# Patient Record
Sex: FEMALE | NOT HISPANIC OR LATINO | URBAN - METROPOLITAN AREA
[De-identification: names, ages, dates, MRNs, and addresses within clinical notes are randomized per-mention and may not be internally consistent; named-entity substitution may affect disease eponyms.]

---

## 2022-05-06 ENCOUNTER — APPOINTMENT (RX ONLY)
Dept: URBAN - METROPOLITAN AREA OTHER 13 | Facility: OTHER | Age: 72
Setting detail: DERMATOLOGY
End: 2022-05-06

## 2022-05-06 DIAGNOSIS — Z71.89 OTHER SPECIFIED COUNSELING: ICD-10-CM

## 2022-05-06 DIAGNOSIS — L57.8 OTHER SKIN CHANGES DUE TO CHRONIC EXPOSURE TO NONIONIZING RADIATION: ICD-10-CM

## 2022-05-06 DIAGNOSIS — L82.1 OTHER SEBORRHEIC KERATOSIS: ICD-10-CM

## 2022-05-06 DIAGNOSIS — L57.3 POIKILODERMA OF CIVATTE: ICD-10-CM

## 2022-05-06 DIAGNOSIS — L71.8 OTHER ROSACEA: ICD-10-CM | Status: INADEQUATELY CONTROLLED

## 2022-05-06 DIAGNOSIS — L65.9 NONSCARRING HAIR LOSS, UNSPECIFIED: ICD-10-CM | Status: INADEQUATELY CONTROLLED

## 2022-05-06 DIAGNOSIS — D22 MELANOCYTIC NEVI: ICD-10-CM

## 2022-05-06 DIAGNOSIS — I83.9 ASYMPTOMATIC VARICOSE VEINS OF LOWER EXTREMITIES: ICD-10-CM

## 2022-05-06 DIAGNOSIS — D18.0 HEMANGIOMA: ICD-10-CM

## 2022-05-06 DIAGNOSIS — L81.4 OTHER MELANIN HYPERPIGMENTATION: ICD-10-CM

## 2022-05-06 DIAGNOSIS — B35.1 TINEA UNGUIUM: ICD-10-CM | Status: INADEQUATELY CONTROLLED

## 2022-05-06 PROBLEM — D22.61 MELANOCYTIC NEVI OF RIGHT UPPER LIMB, INCLUDING SHOULDER: Status: ACTIVE | Noted: 2022-05-06

## 2022-05-06 PROBLEM — D22.5 MELANOCYTIC NEVI OF TRUNK: Status: ACTIVE | Noted: 2022-05-06

## 2022-05-06 PROBLEM — D18.01 HEMANGIOMA OF SKIN AND SUBCUTANEOUS TISSUE: Status: ACTIVE | Noted: 2022-05-06

## 2022-05-06 PROBLEM — D22.72 MELANOCYTIC NEVI OF LEFT LOWER LIMB, INCLUDING HIP: Status: ACTIVE | Noted: 2022-05-06

## 2022-05-06 PROBLEM — D22.62 MELANOCYTIC NEVI OF LEFT UPPER LIMB, INCLUDING SHOULDER: Status: ACTIVE | Noted: 2022-05-06

## 2022-05-06 PROBLEM — I83.93 ASYMPTOMATIC VARICOSE VEINS OF BILATERAL LOWER EXTREMITIES: Status: ACTIVE | Noted: 2022-05-06

## 2022-05-06 PROCEDURE — ? TREATMENT REGIMEN

## 2022-05-06 PROCEDURE — ? COUNSELING

## 2022-05-06 PROCEDURE — ? PRESCRIPTION

## 2022-05-06 PROCEDURE — 99204 OFFICE O/P NEW MOD 45 MIN: CPT

## 2022-05-06 PROCEDURE — ? PRESCRIPTION MEDICATION MANAGEMENT

## 2022-05-06 RX ORDER — TRETIONIN 0.5 MG/G
CREAM TOPICAL
Qty: 45 | Refills: 5 | COMMUNITY
Start: 2022-05-06

## 2022-05-06 RX ORDER — AZELAIC ACID 0.15 G/G
GEL TOPICAL
Qty: 50 | Refills: 3 | COMMUNITY
Start: 2022-05-06

## 2022-05-06 RX ADMIN — TRETIONIN: 0.5 CREAM TOPICAL at 00:00

## 2022-05-06 RX ADMIN — AZELAIC ACID: 0.15 GEL TOPICAL at 00:00

## 2022-05-06 ASSESSMENT — LOCATION ZONE DERM
LOCATION ZONE: SCALP
LOCATION ZONE: TRUNK
LOCATION ZONE: LEG
LOCATION ZONE: FACE
LOCATION ZONE: ARM
LOCATION ZONE: TOENAIL

## 2022-05-06 ASSESSMENT — LOCATION DETAILED DESCRIPTION DERM
LOCATION DETAILED: RIGHT DISTAL PRETIBIAL REGION
LOCATION DETAILED: LEFT DISTAL POSTERIOR UPPER ARM
LOCATION DETAILED: UPPER STERNUM
LOCATION DETAILED: RIGHT DISTAL POSTERIOR UPPER ARM
LOCATION DETAILED: LEFT GREAT TOENAIL
LOCATION DETAILED: LEFT PROXIMAL PRETIBIAL REGION
LOCATION DETAILED: EPIGASTRIC SKIN
LOCATION DETAILED: LEFT MEDIAL FRONTAL SCALP
LOCATION DETAILED: SUBXIPHOID
LOCATION DETAILED: SUPERIOR LUMBAR SPINE
LOCATION DETAILED: MIDDLE STERNUM
LOCATION DETAILED: RIGHT MEDIAL MALAR CHEEK
LOCATION DETAILED: RIGHT ANTERIOR DISTAL THIGH
LOCATION DETAILED: LEFT INFERIOR CENTRAL MALAR CHEEK
LOCATION DETAILED: RIGHT PROXIMAL POSTERIOR UPPER ARM
LOCATION DETAILED: SUPERIOR MID FOREHEAD
LOCATION DETAILED: LEFT ANTERIOR DISTAL THIGH
LOCATION DETAILED: LEFT DISTAL PRETIBIAL REGION
LOCATION DETAILED: INFERIOR THORACIC SPINE
LOCATION DETAILED: RIGHT PROXIMAL CALF
LOCATION DETAILED: RIGHT INFERIOR MEDIAL UPPER BACK
LOCATION DETAILED: LEFT PROXIMAL POSTERIOR UPPER ARM
LOCATION DETAILED: LEFT POPLITEAL SKIN
LOCATION DETAILED: RIGHT SUPERIOR MEDIAL UPPER BACK

## 2022-05-06 ASSESSMENT — LOCATION SIMPLE DESCRIPTION DERM
LOCATION SIMPLE: RIGHT UPPER BACK
LOCATION SIMPLE: CHEST
LOCATION SIMPLE: LEFT CHEEK
LOCATION SIMPLE: LOWER BACK
LOCATION SIMPLE: RIGHT CHEEK
LOCATION SIMPLE: LEFT PRETIBIAL REGION
LOCATION SIMPLE: SUPERIOR FOREHEAD
LOCATION SIMPLE: UPPER BACK
LOCATION SIMPLE: RIGHT PRETIBIAL REGION
LOCATION SIMPLE: RIGHT THIGH
LOCATION SIMPLE: LEFT POPLITEAL SKIN
LOCATION SIMPLE: LEFT SCALP
LOCATION SIMPLE: LEFT GREAT TOE
LOCATION SIMPLE: LEFT POSTERIOR UPPER ARM
LOCATION SIMPLE: RIGHT POSTERIOR UPPER ARM
LOCATION SIMPLE: ABDOMEN
LOCATION SIMPLE: LEFT THIGH
LOCATION SIMPLE: RIGHT CALF

## 2022-05-06 NOTE — PROCEDURE: TREATMENT REGIMEN
Detail Level: Zone
Continue Regimen: Finasteride 5mg tablet(patient was on this medication from her previous dermatologist) \\n\\nBiotin daily\\n\\nRogaine Topical Solution

## 2022-05-06 NOTE — PROCEDURE: PRESCRIPTION MEDICATION MANAGEMENT
Discontinue Regimen: Tretinoin (If she does want to use Tretinoin, do not use in the areas that rosacea is present)
Detail Level: Zone
Render In Strict Bullet Format?: No
Initiate Treatment: Finacea Gel daily to prevent redness
Samples Given: Lamisil cream-Apply to area daily, we discussed that the product will have to be used daily for a long period of time
Continue Regimen: Tretinoin 0.05% cream-Apply to forehead nightly( patient had this cream at previous dermatologist) (Stay away from rosacea areas of the face)

## 2022-08-24 ENCOUNTER — RX ONLY (OUTPATIENT)
Age: 72
Setting detail: RX ONLY
End: 2022-08-24

## 2022-08-24 RX ORDER — FINASTERIDE 5 MG/1
TABLET, FILM COATED ORAL
Qty: 30 | Refills: 2 | Status: ERX | COMMUNITY
Start: 2022-08-24 | End: 2022-10-01

## 2022-11-07 ENCOUNTER — OFFICE VISIT (OUTPATIENT)
Dept: URBAN - METROPOLITAN AREA CLINIC 54 | Facility: CLINIC | Age: 72
End: 2022-11-07
Payer: MEDICARE

## 2022-11-07 DIAGNOSIS — Z86.010 PERSONAL HISTORY OF COLONIC POLYPS: ICD-10-CM

## 2022-11-07 PROCEDURE — 99203 OFFICE O/P NEW LOW 30 MIN: CPT | Performed by: INTERNAL MEDICINE

## 2022-11-07 RX ORDER — MINOXIDIL 2 %
1 ML SOLUTION, NON-ORAL TOPICAL TWICE A DAY
Status: ACTIVE | COMMUNITY

## 2022-11-07 RX ORDER — CLONAZEPAM 0.5 MG/1
1 TABLET AT BEDTIME TABLET ORAL ONCE A DAY
Status: ACTIVE | COMMUNITY

## 2022-11-07 RX ORDER — SODIUM SULFATE, MAGNESIUM SULFATE, AND POTASSIUM CHLORIDE 17.75; 2.7; 2.25 G/1; G/1; G/1
12 TABLETS TABLET ORAL
Qty: 24 TABLETS | Refills: 0 | OUTPATIENT
Start: 2022-11-07 | End: 2022-11-08

## 2022-11-07 RX ORDER — DENOSUMAB 60 MG/ML
AS DIRECTED INJECTION SUBCUTANEOUS
Status: ACTIVE | COMMUNITY

## 2022-11-07 RX ORDER — LORATADINE 10 MG
1 TABLET TABLET ORAL ONCE A DAY
Status: ACTIVE | COMMUNITY

## 2022-11-07 NOTE — HPI-TODAY'S VISIT:
relocated from [facility].  Patient last c scope in 2017 +personal hx of colon polyps. 89 yo aunt w uterine ca. aunt with ovarian ca.  aunt w crc

## 2022-11-08 PROBLEM — 428283002: Status: ACTIVE | Noted: 2022-11-07

## 2022-12-06 ENCOUNTER — APPOINTMENT (RX ONLY)
Dept: URBAN - METROPOLITAN AREA OTHER 13 | Facility: OTHER | Age: 72
Setting detail: DERMATOLOGY
End: 2022-12-06

## 2022-12-06 DIAGNOSIS — L81.4 OTHER MELANIN HYPERPIGMENTATION: ICD-10-CM

## 2022-12-06 DIAGNOSIS — D485 NEOPLASM OF UNCERTAIN BEHAVIOR OF SKIN: ICD-10-CM

## 2022-12-06 PROBLEM — D48.5 NEOPLASM OF UNCERTAIN BEHAVIOR OF SKIN: Status: ACTIVE | Noted: 2022-12-06

## 2022-12-06 PROCEDURE — ? SHAVE REMOVAL

## 2022-12-06 PROCEDURE — ? COUNSELING

## 2022-12-06 PROCEDURE — 11300 SHAVE SKIN LESION 0.5 CM/<: CPT

## 2022-12-06 PROCEDURE — 99214 OFFICE O/P EST MOD 30 MIN: CPT | Mod: 25

## 2022-12-06 ASSESSMENT — LOCATION SIMPLE DESCRIPTION DERM
LOCATION SIMPLE: LEFT CHEEK
LOCATION SIMPLE: CHEST

## 2022-12-06 ASSESSMENT — LOCATION DETAILED DESCRIPTION DERM
LOCATION DETAILED: RIGHT LATERAL SUPERIOR CHEST
LOCATION DETAILED: LEFT INFERIOR CENTRAL MALAR CHEEK
LOCATION DETAILED: STERNAL NOTCH

## 2022-12-06 ASSESSMENT — LOCATION ZONE DERM
LOCATION ZONE: FACE
LOCATION ZONE: TRUNK

## 2022-12-06 NOTE — PROCEDURE: SHAVE REMOVAL
Medical Necessity Information: It is in your best interest to select a reason for this procedure from the list below. All of these items fulfill various CMS LCD requirements except the new and changing color options.
Medical Necessity Clause: This procedure was medically necessary because the lesion that was treated was:
Lab: [id number]
Lab Facility: 0
Detail Level: Detailed
Was A Bandage Applied: Yes
Size Of Lesion In Cm (Required): 0.4
Depth Of Shave: dermis
Biopsy Method: Dermablade
Anesthesia Type: 1% lidocaine with epinephrine
Hemostasis: Drysol
Wound Care: Petrolatum
Render Path Notes In Note?: No
Consent was obtained from the patient. The risks and benefits to therapy were discussed in detail. Specifically, the risks of infection, scarring, bleeding, prolonged wound healing, incomplete removal, allergy to anesthesia, nerve injury and recurrence were addressed. Prior to the procedure, the treatment site was clearly identified and confirmed by the patient. All components of Universal Protocol/PAUSE Rule completed.
Post-Care Instructions: I reviewed with the patient in detail post-care instructions. Patient is to keep the biopsy site dry overnight, and then apply bacitracin twice daily until healed. Patient may apply hydrogen peroxide soaks to remove any crusting.
Notification Instructions: Patient will be notified of pathology results. However, patient instructed to call the office if not contacted within 2 weeks.
Billing Type: Third-Party Bill

## 2022-12-14 ENCOUNTER — OFFICE VISIT (OUTPATIENT)
Dept: URBAN - METROPOLITAN AREA SURGERY CENTER 14 | Facility: SURGERY CENTER | Age: 72
End: 2022-12-14

## 2022-12-14 ENCOUNTER — CLAIMS CREATED FROM THE CLAIM WINDOW (OUTPATIENT)
Dept: URBAN - METROPOLITAN AREA CLINIC 4 | Facility: CLINIC | Age: 72
End: 2022-12-14
Payer: MEDICARE

## 2022-12-14 ENCOUNTER — CLAIMS CREATED FROM THE CLAIM WINDOW (OUTPATIENT)
Dept: URBAN - METROPOLITAN AREA SURGERY CENTER 14 | Facility: SURGERY CENTER | Age: 72
End: 2022-12-14
Payer: MEDICARE

## 2022-12-14 DIAGNOSIS — D12.0 BENIGN NEOPLASM OF CECUM: ICD-10-CM

## 2022-12-14 DIAGNOSIS — D12.0 ADENOMA OF CECUM: ICD-10-CM

## 2022-12-14 DIAGNOSIS — Z86.010 ADENOMAS PERSONAL HISTORY OF COLONIC POLYPS: ICD-10-CM

## 2022-12-14 DIAGNOSIS — K55.20 ACQUIRED ARTERIOVENOUS MALFORMATION OF COLON: ICD-10-CM

## 2022-12-14 PROCEDURE — G8907 PT DOC NO EVENTS ON DISCHARG: HCPCS | Performed by: INTERNAL MEDICINE

## 2022-12-14 PROCEDURE — 45385 COLONOSCOPY W/LESION REMOVAL: CPT | Performed by: INTERNAL MEDICINE

## 2022-12-14 PROCEDURE — 88305 TISSUE EXAM BY PATHOLOGIST: CPT | Performed by: PATHOLOGY

## 2022-12-20 ENCOUNTER — APPOINTMENT (RX ONLY)
Dept: URBAN - METROPOLITAN AREA OTHER 13 | Facility: OTHER | Age: 72
Setting detail: DERMATOLOGY
End: 2022-12-20

## 2022-12-20 DIAGNOSIS — L82.0 INFLAMED SEBORRHEIC KERATOSIS: ICD-10-CM

## 2022-12-20 DIAGNOSIS — L57.0 ACTINIC KERATOSIS: ICD-10-CM

## 2022-12-20 DIAGNOSIS — L259 CONTACT DERMATITIS AND OTHER ECZEMA, UNSPECIFIED CAUSE: ICD-10-CM

## 2022-12-20 PROBLEM — L30.8 OTHER SPECIFIED DERMATITIS: Status: ACTIVE | Noted: 2022-12-20

## 2022-12-20 PROCEDURE — ? PRESCRIPTION MEDICATION MANAGEMENT

## 2022-12-20 PROCEDURE — 99214 OFFICE O/P EST MOD 30 MIN: CPT | Mod: 25

## 2022-12-20 PROCEDURE — ? LIQUID NITROGEN

## 2022-12-20 PROCEDURE — 17110 DESTRUCTION B9 LES UP TO 14: CPT

## 2022-12-20 PROCEDURE — ? COUNSELING

## 2022-12-20 PROCEDURE — 17000 DESTRUCT PREMALG LESION: CPT | Mod: 59

## 2022-12-20 ASSESSMENT — LOCATION DETAILED DESCRIPTION DERM
LOCATION DETAILED: UPPER STERNUM
LOCATION DETAILED: SUPERIOR THORACIC SPINE
LOCATION DETAILED: RIGHT LATERAL SUPERIOR CHEST

## 2022-12-20 ASSESSMENT — LOCATION SIMPLE DESCRIPTION DERM
LOCATION SIMPLE: UPPER BACK
LOCATION SIMPLE: CHEST

## 2022-12-20 ASSESSMENT — LOCATION ZONE DERM: LOCATION ZONE: TRUNK

## 2022-12-20 NOTE — PROCEDURE: LIQUID NITROGEN
Render Post-Care Instructions In Note?: no
Show Applicator Variable?: Yes
Post-Care Instructions: I reviewed with the patient in detail post-care instructions. Patient is to wear sunprotection, and avoid picking at any of the treated lesions. Pt may apply Vaseline to crusted or scabbing areas.
Detail Level: Detailed
Number Of Freeze-Thaw Cycles: 3 freeze-thaw cycles
Consent: The patient's consent was obtained including but not limited to risks of crusting, scabbing, blistering, scarring, darker or lighter pigmentary change, recurrence, incomplete removal and infection.
Duration Of Freeze Thaw-Cycle (Seconds): 0
Spray Paint Text: The liquid nitrogen was applied to the skin utilizing a spray paint frosting technique.
Medical Necessity Information: It is in your best interest to select a reason for this procedure from the list below. All of these items fulfill various CMS LCD requirements except the new and changing color options.
Medical Necessity Clause: This procedure was medically necessary because the lesions that were treated were:

## 2022-12-20 NOTE — PROCEDURE: PRESCRIPTION MEDICATION MANAGEMENT
Detail Level: Zone
Render In Strict Bullet Format?: No
Plan: If spot does not go away we will biopsy it
Initiate Treatment: Use hydrocortisone (patient already has)

## 2023-01-12 ENCOUNTER — TELEPHONE ENCOUNTER (OUTPATIENT)
Dept: URBAN - METROPOLITAN AREA CLINIC 54 | Facility: CLINIC | Age: 73
End: 2023-01-12

## 2023-03-16 ENCOUNTER — APPOINTMENT (RX ONLY)
Dept: URBAN - METROPOLITAN AREA OTHER 13 | Facility: OTHER | Age: 73
Setting detail: DERMATOLOGY
End: 2023-03-16

## 2023-03-16 DIAGNOSIS — L81.4 OTHER MELANIN HYPERPIGMENTATION: ICD-10-CM

## 2023-03-16 DIAGNOSIS — L82.0 INFLAMED SEBORRHEIC KERATOSIS: ICD-10-CM

## 2023-03-16 PROCEDURE — 17110 DESTRUCTION B9 LES UP TO 14: CPT

## 2023-03-16 PROCEDURE — ? LIQUID NITROGEN

## 2023-03-16 PROCEDURE — 99213 OFFICE O/P EST LOW 20 MIN: CPT | Mod: 25

## 2023-03-16 PROCEDURE — ? COUNSELING

## 2023-03-16 ASSESSMENT — LOCATION DETAILED DESCRIPTION DERM
LOCATION DETAILED: RIGHT INFERIOR LATERAL NECK
LOCATION DETAILED: RIGHT DORSAL THUMB METACARPOPHALANGEAL JOINT
LOCATION DETAILED: RIGHT CENTRAL LATERAL NECK
LOCATION DETAILED: RIGHT CENTRAL ANTERIOR NECK

## 2023-03-16 ASSESSMENT — LOCATION SIMPLE DESCRIPTION DERM
LOCATION SIMPLE: NECK
LOCATION SIMPLE: RIGHT THUMB

## 2023-03-16 ASSESSMENT — LOCATION ZONE DERM
LOCATION ZONE: FINGER
LOCATION ZONE: NECK

## 2023-03-16 NOTE — PROCEDURE: LIQUID NITROGEN
Post-Care Instructions: I reviewed with the patient in detail post-care instructions. Patient is to wear sunprotection, and avoid picking at any of the treated lesions. Pt may apply Vaseline to crusted or scabbing areas.
Show Spray Paint Technique Variable?: Yes
Detail Level: Detailed
Add 52 Modifier (Optional): no
Spray Paint Text: The liquid nitrogen was applied to the skin utilizing a spray paint frosting technique.
Consent: The patient's consent was obtained including but not limited to risks of crusting, scabbing, blistering, scarring, darker or lighter pigmentary change, recurrence, incomplete removal and infection.
Medical Necessity Information: It is in your best interest to select a reason for this procedure from the list below. All of these items fulfill various CMS LCD requirements except the new and changing color options.
Medical Necessity Clause: This procedure was medically necessary because the lesions that were treated were:

## 2023-03-23 ENCOUNTER — WEB ENCOUNTER (OUTPATIENT)
Dept: URBAN - METROPOLITAN AREA CLINIC 54 | Facility: CLINIC | Age: 73
End: 2023-03-23

## 2023-03-28 ENCOUNTER — WEB ENCOUNTER (OUTPATIENT)
Dept: URBAN - METROPOLITAN AREA CLINIC 54 | Facility: CLINIC | Age: 73
End: 2023-03-28

## 2023-04-02 ENCOUNTER — LAB OUTSIDE AN ENCOUNTER (OUTPATIENT)
Dept: URBAN - METROPOLITAN AREA CLINIC 54 | Facility: CLINIC | Age: 73
End: 2023-04-02

## 2023-04-08 ENCOUNTER — WEB ENCOUNTER (OUTPATIENT)
Dept: URBAN - METROPOLITAN AREA CLINIC 54 | Facility: CLINIC | Age: 73
End: 2023-04-08

## 2023-05-10 ENCOUNTER — OFFICE VISIT (OUTPATIENT)
Dept: URBAN - METROPOLITAN AREA SURGERY CENTER 14 | Facility: SURGERY CENTER | Age: 73
End: 2023-05-10

## 2023-05-10 ENCOUNTER — WEB ENCOUNTER (OUTPATIENT)
Dept: URBAN - METROPOLITAN AREA CLINIC 54 | Facility: CLINIC | Age: 73
End: 2023-05-10

## 2023-05-11 ENCOUNTER — WEB ENCOUNTER (OUTPATIENT)
Dept: URBAN - METROPOLITAN AREA CLINIC 54 | Facility: CLINIC | Age: 73
End: 2023-05-11

## 2023-05-25 ENCOUNTER — APPOINTMENT (RX ONLY)
Dept: URBAN - METROPOLITAN AREA OTHER 13 | Facility: OTHER | Age: 73
Setting detail: DERMATOLOGY
End: 2023-05-25

## 2023-05-25 DIAGNOSIS — L57.3 POIKILODERMA OF CIVATTE: ICD-10-CM

## 2023-05-25 DIAGNOSIS — I83.9 ASYMPTOMATIC VARICOSE VEINS OF LOWER EXTREMITIES: ICD-10-CM

## 2023-05-25 DIAGNOSIS — L72.0 EPIDERMAL CYST: ICD-10-CM

## 2023-05-25 DIAGNOSIS — L82.0 INFLAMED SEBORRHEIC KERATOSIS: ICD-10-CM

## 2023-05-25 DIAGNOSIS — L72.2 STEATOCYSTOMA MULTIPLEX: ICD-10-CM

## 2023-05-25 DIAGNOSIS — D18.0 HEMANGIOMA: ICD-10-CM

## 2023-05-25 DIAGNOSIS — L73.8 OTHER SPECIFIED FOLLICULAR DISORDERS: ICD-10-CM

## 2023-05-25 DIAGNOSIS — L82.1 OTHER SEBORRHEIC KERATOSIS: ICD-10-CM

## 2023-05-25 DIAGNOSIS — Z71.89 OTHER SPECIFIED COUNSELING: ICD-10-CM

## 2023-05-25 DIAGNOSIS — L81.4 OTHER MELANIN HYPERPIGMENTATION: ICD-10-CM

## 2023-05-25 DIAGNOSIS — D22 MELANOCYTIC NEVI: ICD-10-CM

## 2023-05-25 PROBLEM — I83.93 ASYMPTOMATIC VARICOSE VEINS OF BILATERAL LOWER EXTREMITIES: Status: ACTIVE | Noted: 2023-05-25

## 2023-05-25 PROBLEM — D18.01 HEMANGIOMA OF SKIN AND SUBCUTANEOUS TISSUE: Status: ACTIVE | Noted: 2023-05-25

## 2023-05-25 PROBLEM — D22.61 MELANOCYTIC NEVI OF RIGHT UPPER LIMB, INCLUDING SHOULDER: Status: ACTIVE | Noted: 2023-05-25

## 2023-05-25 PROBLEM — D22.5 MELANOCYTIC NEVI OF TRUNK: Status: ACTIVE | Noted: 2023-05-25

## 2023-05-25 PROBLEM — D22.62 MELANOCYTIC NEVI OF LEFT UPPER LIMB, INCLUDING SHOULDER: Status: ACTIVE | Noted: 2023-05-25

## 2023-05-25 PROBLEM — D22.72 MELANOCYTIC NEVI OF LEFT LOWER LIMB, INCLUDING HIP: Status: ACTIVE | Noted: 2023-05-25

## 2023-05-25 PROCEDURE — 99214 OFFICE O/P EST MOD 30 MIN: CPT | Mod: 25

## 2023-05-25 PROCEDURE — ? LIQUID NITROGEN

## 2023-05-25 PROCEDURE — 17110 DESTRUCTION B9 LES UP TO 14: CPT

## 2023-05-25 PROCEDURE — ? COUNSELING

## 2023-05-25 ASSESSMENT — LOCATION ZONE DERM
LOCATION ZONE: TRUNK
LOCATION ZONE: FACE
LOCATION ZONE: LEG
LOCATION ZONE: VULVA
LOCATION ZONE: NECK
LOCATION ZONE: NOSE
LOCATION ZONE: ARM
LOCATION ZONE: FINGER

## 2023-05-25 ASSESSMENT — LOCATION SIMPLE DESCRIPTION DERM
LOCATION SIMPLE: RIGHT THIGH
LOCATION SIMPLE: LEFT NOSE
LOCATION SIMPLE: RIGHT UPPER BACK
LOCATION SIMPLE: RIGHT BREAST
LOCATION SIMPLE: RIGHT UPPER ARM
LOCATION SIMPLE: RIGHT PRETIBIAL REGION
LOCATION SIMPLE: CHEST
LOCATION SIMPLE: LEFT UPPER ARM
LOCATION SIMPLE: INFERIOR FOREHEAD
LOCATION SIMPLE: LEFT POPLITEAL SKIN
LOCATION SIMPLE: LEFT PRETIBIAL REGION
LOCATION SIMPLE: RIGHT THUMB
LOCATION SIMPLE: RIGHT CALF
LOCATION SIMPLE: RIGHT POSTERIOR UPPER ARM
LOCATION SIMPLE: LEFT ANTERIOR NECK
LOCATION SIMPLE: RIGHT TEMPLE
LOCATION SIMPLE: ABDOMEN
LOCATION SIMPLE: LABIA MAJORA
LOCATION SIMPLE: LEFT POSTERIOR UPPER ARM
LOCATION SIMPLE: LEFT THIGH
LOCATION SIMPLE: LOWER BACK

## 2023-05-25 ASSESSMENT — LOCATION DETAILED DESCRIPTION DERM
LOCATION DETAILED: LEFT PROXIMAL PRETIBIAL REGION
LOCATION DETAILED: SUPERIOR LUMBAR SPINE
LOCATION DETAILED: RIGHT ANTERIOR DISTAL THIGH
LOCATION DETAILED: LEFT INFERIOR ANTERIOR NECK
LOCATION DETAILED: INFERIOR MID FOREHEAD
LOCATION DETAILED: SUBXIPHOID
LOCATION DETAILED: RIGHT DISTAL POSTERIOR UPPER ARM
LOCATION DETAILED: LEFT ANTERIOR DISTAL THIGH
LOCATION DETAILED: RIGHT DISTAL PRETIBIAL REGION
LOCATION DETAILED: MIDDLE STERNUM
LOCATION DETAILED: LEFT PROXIMAL POSTERIOR UPPER ARM
LOCATION DETAILED: LEFT NASAL ALA
LOCATION DETAILED: RIGHT DORSAL THUMB METACARPOPHALANGEAL JOINT
LOCATION DETAILED: LEFT CLAVICULAR NECK
LOCATION DETAILED: LEFT DISTAL POSTERIOR UPPER ARM
LOCATION DETAILED: RIGHT INFERIOR MEDIAL UPPER BACK
LOCATION DETAILED: LEFT INFERIOR LATERAL NECK
LOCATION DETAILED: RIGHT LABIUM MAJUS
LOCATION DETAILED: RIGHT CENTRAL TEMPLE
LOCATION DETAILED: RIGHT MEDIAL BREAST 3-4:00 REGION
LOCATION DETAILED: LEFT POPLITEAL SKIN
LOCATION DETAILED: RIGHT ANTERIOR PROXIMAL THIGH
LOCATION DETAILED: RIGHT PROXIMAL POSTERIOR UPPER ARM
LOCATION DETAILED: EPIGASTRIC SKIN
LOCATION DETAILED: LEFT DISTAL PRETIBIAL REGION
LOCATION DETAILED: RIGHT PROXIMAL CALF
LOCATION DETAILED: RIGHT PROXIMAL PRETIBIAL REGION
LOCATION DETAILED: RIGHT SUPERIOR MEDIAL UPPER BACK
LOCATION DETAILED: LEFT ANTERIOR DISTAL UPPER ARM
LOCATION DETAILED: RIGHT ANTERIOR LATERAL DISTAL UPPER ARM

## 2023-05-25 NOTE — PROCEDURE: MIPS QUALITY
Quality 47: Advance Care Plan: Advance Care Planning discussed and documented; advance care plan or surrogate decision maker documented in the medical record.
Quality 110: Preventive Care And Screening: Influenza Immunization: Influenza Immunization Administered during Influenza season
Quality 431: Preventive Care And Screening: Unhealthy Alcohol Use - Screening: Patient not identified as an unhealthy alcohol user when screened for unhealthy alcohol use using a systematic screening method
Quality 226: Preventive Care And Screening: Tobacco Use: Screening And Cessation Intervention: Patient screened for tobacco use and is an ex/non-smoker
Quality 111:Pneumonia Vaccination Status For Older Adults: Patient received any pneumococcal conjugate or polysaccharide vaccine on or after their 60th birthday and before the end of the measurement period
Quality 130: Documentation Of Current Medications In The Medical Record: Current Medications Documented
Detail Level: Detailed

## 2023-06-07 ENCOUNTER — OFFICE VISIT (OUTPATIENT)
Dept: URBAN - METROPOLITAN AREA SURGERY CENTER 14 | Facility: SURGERY CENTER | Age: 73
End: 2023-06-07

## 2023-06-16 ENCOUNTER — OFFICE VISIT (OUTPATIENT)
Dept: URBAN - METROPOLITAN AREA SURGERY CENTER 14 | Facility: SURGERY CENTER | Age: 73
End: 2023-06-16

## 2023-06-21 ENCOUNTER — CLAIMS CREATED FROM THE CLAIM WINDOW (OUTPATIENT)
Dept: URBAN - METROPOLITAN AREA CLINIC 4 | Facility: CLINIC | Age: 73
End: 2023-06-21
Payer: MEDICARE

## 2023-06-21 ENCOUNTER — CLAIMS CREATED FROM THE CLAIM WINDOW (OUTPATIENT)
Dept: URBAN - METROPOLITAN AREA SURGERY CENTER 14 | Facility: SURGERY CENTER | Age: 73
End: 2023-06-21

## 2023-06-21 ENCOUNTER — CLAIMS CREATED FROM THE CLAIM WINDOW (OUTPATIENT)
Dept: URBAN - METROPOLITAN AREA SURGERY CENTER 14 | Facility: SURGERY CENTER | Age: 73
End: 2023-06-21
Payer: MEDICARE

## 2023-06-21 DIAGNOSIS — K31.89 ACQUIRED DEFORMITY OF DUODENUM: ICD-10-CM

## 2023-06-21 DIAGNOSIS — D13.2 ADENOMA OF DUODENUM: ICD-10-CM

## 2023-06-21 DIAGNOSIS — K31.7 POLYP OF STOMACH AND DUODENUM: ICD-10-CM

## 2023-06-21 DIAGNOSIS — K31.89 OTHER DISEASES OF STOMACH AND DUODENUM: ICD-10-CM

## 2023-06-21 DIAGNOSIS — K21.00 ALKALINE REFLUX ESOPHAGITIS: ICD-10-CM

## 2023-06-21 PROCEDURE — 88312 SPECIAL STAINS GROUP 1: CPT | Performed by: PATHOLOGY

## 2023-06-21 PROCEDURE — 88305 TISSUE EXAM BY PATHOLOGIST: CPT | Performed by: PATHOLOGY

## 2023-06-21 PROCEDURE — 43239 EGD BIOPSY SINGLE/MULTIPLE: CPT | Performed by: INTERNAL MEDICINE

## 2023-06-21 PROCEDURE — G8907 PT DOC NO EVENTS ON DISCHARG: HCPCS | Performed by: INTERNAL MEDICINE

## 2023-06-29 ENCOUNTER — TELEPHONE ENCOUNTER (OUTPATIENT)
Dept: URBAN - METROPOLITAN AREA CLINIC 23 | Facility: CLINIC | Age: 73
End: 2023-06-29

## 2023-06-29 ENCOUNTER — WEB ENCOUNTER (OUTPATIENT)
Dept: URBAN - METROPOLITAN AREA CLINIC 54 | Facility: CLINIC | Age: 73
End: 2023-06-29

## 2023-07-06 ENCOUNTER — RX ONLY (OUTPATIENT)
Age: 73
Setting detail: RX ONLY
End: 2023-07-06

## 2023-07-06 RX ORDER — TRETINOIN 0.5 MG/G
CREAM TOPICAL
Qty: 45 | Refills: 0 | Status: ERX | COMMUNITY
Start: 2023-07-06

## 2023-07-06 RX ORDER — AZELAIC ACID 0.15 G/G
GEL TOPICAL
Qty: 50 | Refills: 3 | Status: ERX

## 2023-07-06 NOTE — PROCEDURE: MIPS QUALITY
Detail Level: Detailed
Quality 226: Preventive Care And Screening: Tobacco Use: Screening And Cessation Intervention: Patient screened for tobacco use and is an ex/non-smoker
Quality 111:Pneumonia Vaccination Status For Older Adults: Pneumococcal Vaccination Previously Received
Quality 402: Tobacco Use And Help With Quitting Among Adolescents: Patient screened for tobacco and never smoked
Quality 431: Preventive Care And Screening: Unhealthy Alcohol Use - Screening: Patient not identified as an unhealthy alcohol user when screened for unhealthy alcohol use using a systematic screening method
Quality 110: Preventive Care And Screening: Influenza Immunization: Influenza Immunization Administered during Influenza season
Statement Selected

## 2023-07-14 ENCOUNTER — TELEPHONE ENCOUNTER (OUTPATIENT)
Dept: URBAN - METROPOLITAN AREA CLINIC 54 | Facility: CLINIC | Age: 73
End: 2023-07-14

## 2023-07-19 ENCOUNTER — LAB OUTSIDE AN ENCOUNTER (OUTPATIENT)
Dept: URBAN - METROPOLITAN AREA CLINIC 54 | Facility: CLINIC | Age: 73
End: 2023-07-19

## 2023-09-02 ENCOUNTER — WEB ENCOUNTER (OUTPATIENT)
Dept: URBAN - METROPOLITAN AREA CLINIC 54 | Facility: CLINIC | Age: 73
End: 2023-09-02

## 2023-09-27 ENCOUNTER — OFFICE VISIT (OUTPATIENT)
Dept: URBAN - METROPOLITAN AREA MEDICAL CENTER 28 | Facility: MEDICAL CENTER | Age: 73
End: 2023-09-27
Payer: MEDICARE

## 2023-09-27 DIAGNOSIS — K86.89 ACUTE PANCREATIC FLUID COLLECTION: ICD-10-CM

## 2023-09-27 DIAGNOSIS — R93.3 ABN FINDINGS-GI TRACT: ICD-10-CM

## 2023-09-27 PROCEDURE — 43259 EGD US EXAM DUODENUM/JEJUNUM: CPT | Performed by: INTERNAL MEDICINE

## 2023-09-27 RX ORDER — LORATADINE 10 MG
1 TABLET TABLET ORAL ONCE A DAY
Status: ACTIVE | COMMUNITY

## 2023-09-27 RX ORDER — DENOSUMAB 60 MG/ML
AS DIRECTED INJECTION SUBCUTANEOUS
Status: ACTIVE | COMMUNITY

## 2023-09-27 RX ORDER — CLONAZEPAM 0.5 MG/1
1 TABLET AT BEDTIME TABLET ORAL ONCE A DAY
Status: ACTIVE | COMMUNITY

## 2023-09-27 RX ORDER — MINOXIDIL 2 %
1 ML SOLUTION, NON-ORAL TOPICAL TWICE A DAY
Status: ACTIVE | COMMUNITY

## 2023-10-25 ENCOUNTER — OFFICE VISIT (OUTPATIENT)
Dept: URBAN - NONMETROPOLITAN AREA CLINIC 4 | Facility: CLINIC | Age: 73
End: 2023-10-25

## 2023-10-25 ENCOUNTER — OFFICE VISIT (OUTPATIENT)
Dept: URBAN - METROPOLITAN AREA CLINIC 54 | Facility: CLINIC | Age: 73
End: 2023-10-25

## 2023-10-27 ENCOUNTER — DASHBOARD ENCOUNTERS (OUTPATIENT)
Age: 73
End: 2023-10-27

## 2023-10-27 ENCOUNTER — CLAIMS CREATED FROM THE CLAIM WINDOW (OUTPATIENT)
Dept: URBAN - METROPOLITAN AREA CLINIC 54 | Facility: CLINIC | Age: 73
End: 2023-10-27
Payer: MEDICARE

## 2023-10-27 VITALS
HEIGHT: 65 IN | TEMPERATURE: 97.4 F | DIASTOLIC BLOOD PRESSURE: 79 MMHG | WEIGHT: 137.8 LBS | SYSTOLIC BLOOD PRESSURE: 134 MMHG | BODY MASS INDEX: 22.96 KG/M2 | HEART RATE: 70 BPM

## 2023-10-27 DIAGNOSIS — Z83.79 FAMILY HISTORY OF BARRETT'S ESOPHAGUS: ICD-10-CM

## 2023-10-27 DIAGNOSIS — D49.0 IPMN (INTRADUCTAL PAPILLARY MUCINOUS NEOPLASM): ICD-10-CM

## 2023-10-27 DIAGNOSIS — D13.2 DUODENAL ADENOMA: ICD-10-CM

## 2023-10-27 DIAGNOSIS — K21.00 GASTROESOPHAGEAL REFLUX DISEASE WITH ESOPHAGITIS WITHOUT HEMORRHAGE: ICD-10-CM

## 2023-10-27 DIAGNOSIS — Z86.010 HISTORY OF COLON POLYPS: ICD-10-CM

## 2023-10-27 PROBLEM — 266433003: Status: ACTIVE | Noted: 2023-10-27

## 2023-10-27 PROCEDURE — 99214 OFFICE O/P EST MOD 30 MIN: CPT

## 2023-10-27 RX ORDER — CLONAZEPAM 0.5 MG/1
1 TABLET AT BEDTIME TABLET ORAL ONCE A DAY
Status: ACTIVE | COMMUNITY

## 2023-10-27 RX ORDER — MINOXIDIL 2 %
1 ML SOLUTION, NON-ORAL TOPICAL TWICE A DAY
Status: ACTIVE | COMMUNITY

## 2023-10-27 RX ORDER — DENOSUMAB 60 MG/ML
AS DIRECTED INJECTION SUBCUTANEOUS
Status: ACTIVE | COMMUNITY

## 2023-10-27 RX ORDER — LORATADINE 10 MG
1 TABLET TABLET ORAL ONCE A DAY
Status: ACTIVE | COMMUNITY

## 2023-10-27 NOTE — HPI-TODAY'S VISIT:
11/27/22:1relocated from [facility].  Patient last c scope in 2017 +personal hx of colon polyps. 91 yo aunt w uterine ca. aunt with ovarian ca.  aunt w crc  10/27/23: Pt presents for EUS f/u. No complaints  Colonoscopy 12/14/22: - Perianal skin tags found on perianal exam. - Diverticulosis in the entire examined colon. - One 3 mm polyp in the cecum, removed with a cold snare. Resected and retrieved. - The examination was otherwise normal. - A single non-bleeding colonic angiodysplastic lesion. Biopsy: TA, 5 years  EGD 6/21/23: - LA Grade B reflux esophagitis with no bleeding. Biopsied. - Erythematous mucosa in the stomach. Biopsied. - Mucosal changes in the duodenum. Biopsied. - The examination was otherwise normal. Biopsy:  - Duodenum: Tubular adenoma with low grade dysplasia.  - Stomach: Foveolar hyperplasia. No evidence of H pylori organisms or intestinal metaplasia. Negative for dysplasia or malignancy.  - GE Junction: Squamocolumnar mucosa with reflux type changes. No evidence of Anderson's esophagus or EoE.  EUS 9/27/23: - Normal esophagus. - Normal stomach. - Normal examined duodenum. - A cystic lesion was seen in the pancreatic body. Tissue has not been obtained. However, the endosonographic appearance is suspicious for a branched intraductal papillary mucinous neoplasm. - Pancreatic parenchymal abnormalities consisting of hyperechoic strands, hyperechoic foci and lobularity were noted in the entire pancreas. - There was no sign of significant pathology in the common bile duct. - There was no sign of significant pathology in the examined duodenum. - No specimens collected.

## 2023-12-16 ENCOUNTER — RX ONLY (OUTPATIENT)
Age: 73
Setting detail: RX ONLY
End: 2023-12-16

## 2023-12-16 RX ORDER — TRETINOIN 0.5 MG/G
CREAM TOPICAL
Qty: 45 | Refills: 2 | Status: ERX

## 2024-05-31 ENCOUNTER — APPOINTMENT (RX ONLY)
Dept: URBAN - METROPOLITAN AREA OTHER 13 | Facility: OTHER | Age: 74
Setting detail: DERMATOLOGY
End: 2024-05-31

## 2024-05-31 DIAGNOSIS — Z71.89 OTHER SPECIFIED COUNSELING: ICD-10-CM

## 2024-05-31 DIAGNOSIS — L81.4 OTHER MELANIN HYPERPIGMENTATION: ICD-10-CM

## 2024-05-31 DIAGNOSIS — L73.8 OTHER SPECIFIED FOLLICULAR DISORDERS: ICD-10-CM

## 2024-05-31 DIAGNOSIS — L82.0 INFLAMED SEBORRHEIC KERATOSIS: ICD-10-CM

## 2024-05-31 DIAGNOSIS — L82.1 OTHER SEBORRHEIC KERATOSIS: ICD-10-CM

## 2024-05-31 DIAGNOSIS — D18.0 HEMANGIOMA: ICD-10-CM

## 2024-05-31 DIAGNOSIS — D485 NEOPLASM OF UNCERTAIN BEHAVIOR OF SKIN: ICD-10-CM

## 2024-05-31 DIAGNOSIS — L57.3 POIKILODERMA OF CIVATTE: ICD-10-CM

## 2024-05-31 PROBLEM — D18.01 HEMANGIOMA OF SKIN AND SUBCUTANEOUS TISSUE: Status: ACTIVE | Noted: 2024-05-31

## 2024-05-31 PROBLEM — D48.5 NEOPLASM OF UNCERTAIN BEHAVIOR OF SKIN: Status: ACTIVE | Noted: 2024-05-31

## 2024-05-31 PROCEDURE — ? OBSERVATION

## 2024-05-31 PROCEDURE — ? LIQUID NITROGEN

## 2024-05-31 PROCEDURE — 99213 OFFICE O/P EST LOW 20 MIN: CPT | Mod: 25

## 2024-05-31 PROCEDURE — ? BIOPSY BY SHAVE METHOD

## 2024-05-31 PROCEDURE — 11102 TANGNTL BX SKIN SINGLE LES: CPT | Mod: 59

## 2024-05-31 PROCEDURE — 17110 DESTRUCTION B9 LES UP TO 14: CPT

## 2024-05-31 PROCEDURE — ? COUNSELING

## 2024-05-31 ASSESSMENT — LOCATION SIMPLE DESCRIPTION DERM
LOCATION SIMPLE: LEFT CALF
LOCATION SIMPLE: RIGHT FOREHEAD
LOCATION SIMPLE: NECK
LOCATION SIMPLE: RIGHT THIGH
LOCATION SIMPLE: LEFT CHEEK
LOCATION SIMPLE: CHEST
LOCATION SIMPLE: LEFT POSTERIOR UPPER ARM
LOCATION SIMPLE: ABDOMEN
LOCATION SIMPLE: RIGHT POSTERIOR UPPER ARM
LOCATION SIMPLE: RIGHT CHEEK
LOCATION SIMPLE: RIGHT UPPER BACK

## 2024-05-31 ASSESSMENT — LOCATION DETAILED DESCRIPTION DERM
LOCATION DETAILED: RIGHT INFERIOR FOREHEAD
LOCATION DETAILED: RIGHT MID-UPPER BACK
LOCATION DETAILED: LEFT PROXIMAL CALF
LOCATION DETAILED: RIGHT CENTRAL MALAR CHEEK
LOCATION DETAILED: RIGHT INFERIOR LATERAL NECK
LOCATION DETAILED: UPPER STERNUM
LOCATION DETAILED: EPIGASTRIC SKIN
LOCATION DETAILED: RIGHT MEDIAL MALAR CHEEK
LOCATION DETAILED: RIGHT ANTERIOR DISTAL THIGH
LOCATION DETAILED: LEFT INFERIOR CENTRAL MALAR CHEEK
LOCATION DETAILED: RIGHT SUPERIOR UPPER BACK
LOCATION DETAILED: LEFT DISTAL POSTERIOR UPPER ARM
LOCATION DETAILED: RIGHT PROXIMAL POSTERIOR UPPER ARM
LOCATION DETAILED: RIGHT CENTRAL LATERAL NECK
LOCATION DETAILED: RIGHT ANTERIOR PROXIMAL THIGH
LOCATION DETAILED: MIDDLE STERNUM

## 2024-05-31 ASSESSMENT — LOCATION ZONE DERM
LOCATION ZONE: LEG
LOCATION ZONE: TRUNK
LOCATION ZONE: FACE
LOCATION ZONE: NECK
LOCATION ZONE: ARM

## 2024-05-31 NOTE — PROCEDURE: OBSERVATION
Detail Level: Detailed
Size Of Lesion In Cm (Optional): 0
Morphology Per Location (Optional): Baseline photo 5/31/2024

## 2024-05-31 NOTE — PROCEDURE: LIQUID NITROGEN
Include Z78.9 (Other Specified Conditions Influencing Health Status) As An Associated Diagnosis?: No
Medical Necessity Information: It is in your best interest to select a reason for this procedure from the list below. All of these items fulfill various CMS LCD requirements except the new and changing color options.
Show Topical Anesthesia Variable?: Yes
Medical Necessity Clause: This procedure was medically necessary because the lesions that were treated were:
Spray Paint Text: The liquid nitrogen was applied to the skin utilizing a spray paint frosting technique.
Post-Care Instructions: I reviewed with the patient in detail post-care instructions. Patient is to wear sunprotection, and avoid picking at any of the treated lesions. Pt may apply Vaseline to crusted or scabbing areas.
Consent: The patient's consent was obtained including but not limited to risks of crusting, scabbing, blistering, scarring, darker or lighter pigmentary change, recurrence, incomplete removal and infection.
Detail Level: Detailed

## 2024-10-21 ENCOUNTER — OFFICE VISIT (OUTPATIENT)
Dept: URBAN - NONMETROPOLITAN AREA CLINIC 4 | Facility: CLINIC | Age: 74
End: 2024-10-21

## 2024-10-21 ENCOUNTER — LAB OUTSIDE AN ENCOUNTER (OUTPATIENT)
Dept: URBAN - METROPOLITAN AREA CLINIC 54 | Facility: CLINIC | Age: 74
End: 2024-10-21

## 2024-10-21 ENCOUNTER — OFFICE VISIT (OUTPATIENT)
Dept: URBAN - METROPOLITAN AREA CLINIC 54 | Facility: CLINIC | Age: 74
End: 2024-10-21
Payer: MEDICARE

## 2024-10-21 VITALS
HEART RATE: 73 BPM | BODY MASS INDEX: 22.66 KG/M2 | WEIGHT: 136 LBS | DIASTOLIC BLOOD PRESSURE: 89 MMHG | SYSTOLIC BLOOD PRESSURE: 157 MMHG | HEIGHT: 65 IN | TEMPERATURE: 97.2 F

## 2024-10-21 DIAGNOSIS — K21.00 GASTROESOPHAGEAL REFLUX DISEASE WITH ESOPHAGITIS WITHOUT HEMORRHAGE: ICD-10-CM

## 2024-10-21 DIAGNOSIS — Z83.79 FAMILY HISTORY OF BARRETT'S ESOPHAGUS: ICD-10-CM

## 2024-10-21 DIAGNOSIS — D49.0 IPMN (INTRADUCTAL PAPILLARY MUCINOUS NEOPLASM): ICD-10-CM

## 2024-10-21 DIAGNOSIS — D13.2 DUODENAL ADENOMA: ICD-10-CM

## 2024-10-21 DIAGNOSIS — Z86.0100 HISTORY OF COLON POLYPS: ICD-10-CM

## 2024-10-21 PROCEDURE — 99214 OFFICE O/P EST MOD 30 MIN: CPT

## 2024-10-21 RX ORDER — MINOXIDIL 2 %
1 ML SOLUTION, NON-ORAL TOPICAL TWICE A DAY
Status: ACTIVE | COMMUNITY

## 2024-10-21 RX ORDER — GLUCOSAMINE/D3/BOSWELLIA SERRA 1500MG-400
1 TABLET TABLET ORAL ONCE A DAY
Status: ACTIVE | COMMUNITY

## 2024-10-21 RX ORDER — ERGOCALCIFEROL CAPSULES, 1.25 MG/1
1 CAPSULE CAPSULE ORAL
Status: ACTIVE | COMMUNITY

## 2024-10-21 RX ORDER — LORATADINE 10 MG
1 TABLET TABLET ORAL ONCE A DAY
Status: ACTIVE | COMMUNITY

## 2024-10-21 RX ORDER — CLONAZEPAM 0.5 MG/1
1 TABLET TABLET ORAL ONCE A DAY
Status: ACTIVE | COMMUNITY

## 2024-10-21 RX ORDER — DENOSUMAB 60 MG/ML
AS DIRECTED INJECTION SUBCUTANEOUS
Status: ACTIVE | COMMUNITY

## 2024-10-21 RX ORDER — VITAMIN E (DL,TOCOPHERYL ACET) 180 MG
1 CAPSULE CAPSULE ORAL ONCE A DAY
Status: ACTIVE | COMMUNITY

## 2024-10-21 NOTE — HPI-TODAY'S VISIT:
11/27/22:1relocated from [facility].  Patient last c scope in 2017 +personal hx of colon polyps. 91 yo aunt w uterine ca. aunt with ovarian ca.  aunt w crc  10/27/23: Pt presents for EUS f/u. No complaints  Colonoscopy 12/14/22: - Perianal skin tags found on perianal exam. - Diverticulosis in the entire examined colon. - One 3 mm polyp in the cecum, removed with a cold snare. Resected and retrieved. - The examination was otherwise normal. - A single non-bleeding colonic angiodysplastic lesion. Biopsy: TA, 5 years  EGD 6/21/23: - LA Grade B reflux esophagitis with no bleeding. Biopsied. - Erythematous mucosa in the stomach. Biopsied. - Mucosal changes in the duodenum. Biopsied. - The examination was otherwise normal. Biopsy:  - Duodenum: Tubular adenoma with low grade dysplasia.  - Stomach: Foveolar hyperplasia. No evidence of H pylori organisms or intestinal metaplasia. Negative for dysplasia or malignancy.  - GE Junction: Squamocolumnar mucosa with reflux type changes. No evidence of Anderson's esophagus or EoE.  EUS 9/27/23: - Normal esophagus. - Normal stomach. - Normal examined duodenum. - A cystic lesion was seen in the pancreatic body. Tissue has not been obtained. However, the endosonographic appearance is suspicious for a branched intraductal papillary mucinous neoplasm. - Pancreatic parenchymal abnormalities consisting of hyperechoic strands, hyperechoic foci and lobularity were noted in the entire pancreas. - There was no sign of significant pathology in the common bile duct. - There was no sign of significant pathology in the examined duodenum. - No specimens collected.  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 Pt denies any interval changes over the last year. No symptoms of concern. she has cut back on drinking but has not quit.

## 2024-11-18 ENCOUNTER — WEB ENCOUNTER (OUTPATIENT)
Dept: URBAN - METROPOLITAN AREA CLINIC 54 | Facility: CLINIC | Age: 74
End: 2024-11-18

## 2024-11-21 ENCOUNTER — RX ONLY (OUTPATIENT)
Age: 74
Setting detail: RX ONLY
End: 2024-11-21

## 2024-11-21 RX ORDER — AZELAIC ACID 0.15 G/G
AEROSOL, FOAM TOPICAL
Qty: 50 | Refills: 6 | Status: CANCELLED

## 2024-11-21 RX ORDER — AZELAIC ACID 0.15 G/G
GEL TOPICAL
Qty: 50 | Refills: 6 | Status: ERX | COMMUNITY
Start: 2024-11-21

## 2024-12-13 ENCOUNTER — WEB ENCOUNTER (OUTPATIENT)
Dept: URBAN - METROPOLITAN AREA CLINIC 54 | Facility: CLINIC | Age: 74
End: 2024-12-13

## 2024-12-26 ENCOUNTER — TELEPHONE ENCOUNTER (OUTPATIENT)
Dept: URBAN - METROPOLITAN AREA CLINIC 54 | Facility: CLINIC | Age: 74
End: 2024-12-26

## 2024-12-26 ENCOUNTER — LAB OUTSIDE AN ENCOUNTER (OUTPATIENT)
Dept: URBAN - METROPOLITAN AREA CLINIC 54 | Facility: CLINIC | Age: 74
End: 2024-12-26

## 2024-12-27 ENCOUNTER — TELEPHONE ENCOUNTER (OUTPATIENT)
Dept: URBAN - METROPOLITAN AREA CLINIC 54 | Facility: CLINIC | Age: 74
End: 2024-12-27

## 2024-12-29 ENCOUNTER — WEB ENCOUNTER (OUTPATIENT)
Dept: URBAN - METROPOLITAN AREA CLINIC 54 | Facility: CLINIC | Age: 74
End: 2024-12-29

## 2024-12-31 ENCOUNTER — OFFICE VISIT (OUTPATIENT)
Dept: URBAN - METROPOLITAN AREA SURGERY CENTER 14 | Facility: SURGERY CENTER | Age: 74
End: 2024-12-31

## 2025-02-12 ENCOUNTER — OFFICE VISIT (OUTPATIENT)
Dept: URBAN - METROPOLITAN AREA MEDICAL CENTER 24 | Facility: MEDICAL CENTER | Age: 75
End: 2025-02-12

## 2025-02-12 RX ORDER — ERGOCALCIFEROL CAPSULES, 1.25 MG/1
1 CAPSULE CAPSULE ORAL
Status: ACTIVE | COMMUNITY

## 2025-02-12 RX ORDER — MINOXIDIL 2 %
1 ML SOLUTION, NON-ORAL TOPICAL TWICE A DAY
Status: ACTIVE | COMMUNITY

## 2025-02-12 RX ORDER — CLONAZEPAM 0.5 MG/1
1 TABLET TABLET ORAL ONCE A DAY
Status: ACTIVE | COMMUNITY

## 2025-02-12 RX ORDER — CLONAZEPAM 0.5 MG/1
1 TABLET AT BEDTIME TABLET ORAL ONCE A DAY
Status: ACTIVE | COMMUNITY

## 2025-02-12 RX ORDER — LORATADINE 10 MG
1 TABLET TABLET ORAL ONCE A DAY
Status: ACTIVE | COMMUNITY

## 2025-02-12 RX ORDER — DENOSUMAB 60 MG/ML
AS DIRECTED INJECTION SUBCUTANEOUS
Status: ACTIVE | COMMUNITY

## 2025-02-12 RX ORDER — GLUCOSAMINE/D3/BOSWELLIA SERRA 1500MG-400
1 TABLET TABLET ORAL ONCE A DAY
Status: ACTIVE | COMMUNITY

## 2025-02-12 RX ORDER — VITAMIN E (DL,TOCOPHERYL ACET) 180 MG
1 CAPSULE CAPSULE ORAL ONCE A DAY
Status: ACTIVE | COMMUNITY

## 2025-02-21 ENCOUNTER — TELEPHONE ENCOUNTER (OUTPATIENT)
Dept: URBAN - METROPOLITAN AREA CLINIC 82 | Facility: CLINIC | Age: 75
End: 2025-02-21

## 2025-03-03 ENCOUNTER — OFFICE VISIT (OUTPATIENT)
Dept: URBAN - METROPOLITAN AREA CLINIC 54 | Facility: CLINIC | Age: 75
End: 2025-03-03
Payer: MEDICARE

## 2025-03-03 VITALS
HEIGHT: 65 IN | SYSTOLIC BLOOD PRESSURE: 132 MMHG | TEMPERATURE: 98.1 F | DIASTOLIC BLOOD PRESSURE: 78 MMHG | WEIGHT: 134.6 LBS | HEART RATE: 77 BPM | BODY MASS INDEX: 22.42 KG/M2

## 2025-03-03 DIAGNOSIS — K86.2 PANCREATIC CYST: ICD-10-CM

## 2025-03-03 DIAGNOSIS — D13.2 DUODENAL ADENOMA: ICD-10-CM

## 2025-03-03 DIAGNOSIS — Z83.79 FAMILY HISTORY OF BARRETT'S ESOPHAGUS: ICD-10-CM

## 2025-03-03 DIAGNOSIS — K21.00 GASTROESOPHAGEAL REFLUX DISEASE WITH ESOPHAGITIS WITHOUT HEMORRHAGE: ICD-10-CM

## 2025-03-03 DIAGNOSIS — Z86.0100 HISTORY OF COLON POLYPS: ICD-10-CM

## 2025-03-03 DIAGNOSIS — D49.0 IPMN (INTRADUCTAL PAPILLARY MUCINOUS NEOPLASM): ICD-10-CM

## 2025-03-03 PROCEDURE — 99213 OFFICE O/P EST LOW 20 MIN: CPT

## 2025-03-03 RX ORDER — CLONAZEPAM 0.5 MG/1
1 TABLET AT BEDTIME TABLET ORAL ONCE A DAY
Status: ACTIVE | COMMUNITY

## 2025-03-03 RX ORDER — MINOXIDIL 2 %
1 ML SOLUTION, NON-ORAL TOPICAL TWICE A DAY
Status: ACTIVE | COMMUNITY

## 2025-03-03 RX ORDER — PYRITHIONE ZINC 1 G/ML
AS DIRECTED LOTION/SHAMPOO TOPICAL
Status: ACTIVE | COMMUNITY

## 2025-03-03 RX ORDER — LORATADINE 10 MG
1 TABLET TABLET ORAL ONCE A DAY
Status: ACTIVE | COMMUNITY

## 2025-03-03 RX ORDER — ERGOCALCIFEROL CAPSULES, 1.25 MG/1
1 CAPSULE CAPSULE ORAL
Status: ACTIVE | COMMUNITY

## 2025-03-03 RX ORDER — VITAMIN E (DL,TOCOPHERYL ACET) 180 MG
1 CAPSULE CAPSULE ORAL ONCE A DAY
Status: ACTIVE | COMMUNITY

## 2025-03-03 RX ORDER — DENOSUMAB 60 MG/ML
AS DIRECTED INJECTION SUBCUTANEOUS
Status: ACTIVE | COMMUNITY

## 2025-03-03 NOTE — HPI-TODAY'S VISIT:
11/27/22:1relocated from [facility]  Patient last c scope in 2017 +personal hx of colon polyps. 89 yo aunt w uterine ca. aunt with ovarian ca.  aunt w crc  10/27/23: Pt presents for EUS f/u. No complaints  Colonoscopy 12/14/22: - Perianal skin tags found on perianal exam. - Diverticulosis in the entire examined colon. - One 3 mm polyp in the cecum, removed with a cold snare. Resected and retrieved. - The examination was otherwise normal. - A single non-bleeding colonic angiodysplastic lesion. Biopsy: TA, 5 years  EGD 6/21/23: - LA Grade B reflux esophagitis with no bleeding. Biopsied. - Erythematous mucosa in the stomach. Biopsied. - Mucosal changes in the duodenum. Biopsied. - The examination was otherwise normal. Biopsy:  - Duodenum: Tubular adenoma with low grade dysplasia.  - Stomach: Foveolar hyperplasia. No evidence of H pylori organisms or intestinal metaplasia. Negative for dysplasia or malignancy.  - GE Junction: Squamocolumnar mucosa with reflux type changes. No evidence of Anderson's esophagus or EoE.  EUS 9/27/23: - Normal esophagus. - Normal stomach. - Normal examined duodenum. - A cystic lesion was seen in the pancreatic body. Tissue has not been obtained. However, the endosonographic appearance is suspicious for a branched intraductal papillary mucinous neoplasm. - Pancreatic parenchymal abnormalities consisting of hyperechoic strands, hyperechoic foci and lobularity were noted in the entire pancreas. - There was no sign of significant pathology in the common bile duct. - There was no sign of significant pathology in the examined duodenum. - No specimens collected.  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 Pt denies any interval changes over the last year. No symptoms of concern. she has cut back on drinking but has not quit.  3/3/25 Follow Up: Patient returns for f/u of EGD/EUS. Normal appearing duodenum, no residual adenoma. Small pancreatic cysts (up to 10mm) with no high-risk features but with very high CEA on FNA, consistent with mucinous cystic neoplasms.   MRI/MRCP 12/13/24: Multiple pancreatic cysts, some clearly communicate with sidebranch ducts. No main duct dilatation or communication identified. None demonstrate suspicious or malignant features. Certainly could represent multiple side branch intraductal papillary mucinous neoplasms, given the mild clustered appearance especially in the head of the pancreas the possibility of a small serous cystadenoma remains in the differential.  EUS 2/12/25: - The mediastinum was unremarkable endosonographically. - There was no evidence of significant pathology in the visualized portion of the liver. - Multiple cystic lesions were seen in the pancreatic head, genu of the pancreas, pancreatic body, pancreatic tail and pancreatic neck. Fine needle aspiration for fluid performed of two of the larger cysts. - There was no sign of significant pathology in the common bile duct. - Main pancreatic duct (MPD) diameter was measured. Endosonographically, the MPD had a normal appearance. - There was no evidence of significant pathology in the visualized portion of the liver. - A cystic lesion was identified in the left kidney.

## 2025-03-14 ENCOUNTER — RX ONLY (RX ONLY)
Age: 75
End: 2025-03-14

## 2025-03-14 RX ORDER — TRETIONIN 1 MG/G
CREAM TOPICAL
Qty: 45 | Refills: 3 | Status: ERX | COMMUNITY
Start: 2025-03-14

## 2025-06-03 ENCOUNTER — APPOINTMENT (OUTPATIENT)
Dept: URBAN - METROPOLITAN AREA OTHER 13 | Facility: OTHER | Age: 75
Setting detail: DERMATOLOGY
End: 2025-06-03

## 2025-06-03 DIAGNOSIS — L82.0 INFLAMED SEBORRHEIC KERATOSIS: ICD-10-CM

## 2025-06-03 DIAGNOSIS — L72.0 EPIDERMAL CYST: ICD-10-CM

## 2025-06-03 DIAGNOSIS — I83.9 ASYMPTOMATIC VARICOSE VEINS OF LOWER EXTREMITIES: ICD-10-CM

## 2025-06-03 DIAGNOSIS — D18.0 HEMANGIOMA: ICD-10-CM

## 2025-06-03 DIAGNOSIS — L81.4 OTHER MELANIN HYPERPIGMENTATION: ICD-10-CM

## 2025-06-03 DIAGNOSIS — D22 MELANOCYTIC NEVI: ICD-10-CM

## 2025-06-03 DIAGNOSIS — Z71.89 OTHER SPECIFIED COUNSELING: ICD-10-CM

## 2025-06-03 DIAGNOSIS — L98.8 OTHER SPECIFIED DISORDERS OF THE SKIN AND SUBCUTANEOUS TISSUE: ICD-10-CM

## 2025-06-03 DIAGNOSIS — L82.1 OTHER SEBORRHEIC KERATOSIS: ICD-10-CM

## 2025-06-03 PROBLEM — D22.5 MELANOCYTIC NEVI OF TRUNK: Status: ACTIVE | Noted: 2025-06-03

## 2025-06-03 PROBLEM — D18.01 HEMANGIOMA OF SKIN AND SUBCUTANEOUS TISSUE: Status: ACTIVE | Noted: 2025-06-03

## 2025-06-03 PROBLEM — D22.39 MELANOCYTIC NEVI OF OTHER PARTS OF FACE: Status: ACTIVE | Noted: 2025-06-03

## 2025-06-03 PROBLEM — D22.72 MELANOCYTIC NEVI OF LEFT LOWER LIMB, INCLUDING HIP: Status: ACTIVE | Noted: 2025-06-03

## 2025-06-03 PROBLEM — D22.4 MELANOCYTIC NEVI OF SCALP AND NECK: Status: ACTIVE | Noted: 2025-06-03

## 2025-06-03 PROBLEM — D22.71 MELANOCYTIC NEVI OF RIGHT LOWER LIMB, INCLUDING HIP: Status: ACTIVE | Noted: 2025-06-03

## 2025-06-03 PROBLEM — I83.93 ASYMPTOMATIC VARICOSE VEINS OF BILATERAL LOWER EXTREMITIES: Status: ACTIVE | Noted: 2025-06-03

## 2025-06-03 PROCEDURE — ? LIQUID NITROGEN

## 2025-06-03 PROCEDURE — ? COUNSELING

## 2025-06-03 ASSESSMENT — LOCATION SIMPLE DESCRIPTION DERM
LOCATION SIMPLE: RIGHT ANTERIOR NECK
LOCATION SIMPLE: LEFT ANTERIOR NECK
LOCATION SIMPLE: CHEST
LOCATION SIMPLE: RIGHT POSTERIOR UPPER ARM
LOCATION SIMPLE: LEFT EYEBROW
LOCATION SIMPLE: RIGHT POSTERIOR THIGH
LOCATION SIMPLE: LEFT CALF
LOCATION SIMPLE: LEFT LIP
LOCATION SIMPLE: ABDOMEN
LOCATION SIMPLE: LEFT SCALP
LOCATION SIMPLE: RIGHT ZYGOMA
LOCATION SIMPLE: LEFT POSTERIOR UPPER ARM
LOCATION SIMPLE: LEFT CHEEK
LOCATION SIMPLE: NECK
LOCATION SIMPLE: LEFT POPLITEAL SKIN
LOCATION SIMPLE: LEFT PRETIBIAL REGION
LOCATION SIMPLE: RIGHT UPPER BACK
LOCATION SIMPLE: LEFT UPPER BACK
LOCATION SIMPLE: RIGHT THIGH
LOCATION SIMPLE: RIGHT CHEEK
LOCATION SIMPLE: RIGHT CALF
LOCATION SIMPLE: LEFT LOWER BACK

## 2025-06-03 ASSESSMENT — LOCATION ZONE DERM
LOCATION ZONE: LIP
LOCATION ZONE: LEG
LOCATION ZONE: SCALP
LOCATION ZONE: TRUNK
LOCATION ZONE: ARM
LOCATION ZONE: NECK
LOCATION ZONE: FACE

## 2025-06-03 ASSESSMENT — LOCATION DETAILED DESCRIPTION DERM
LOCATION DETAILED: EPIGASTRIC SKIN
LOCATION DETAILED: RIGHT PROXIMAL CALF
LOCATION DETAILED: LEFT RIB CAGE
LOCATION DETAILED: LEFT DISTAL CALF
LOCATION DETAILED: RIGHT SUPERIOR UPPER BACK
LOCATION DETAILED: RIGHT PROXIMAL POSTERIOR UPPER ARM
LOCATION DETAILED: LEFT INFERIOR ANTERIOR NECK
LOCATION DETAILED: RIGHT INFERIOR CENTRAL MALAR CHEEK
LOCATION DETAILED: LEFT CENTRAL FRONTAL SCALP
LOCATION DETAILED: LEFT PROXIMAL POSTERIOR UPPER ARM
LOCATION DETAILED: LEFT MEDIAL UPPER BACK
LOCATION DETAILED: LEFT SUPERIOR MEDIAL UPPER BACK
LOCATION DETAILED: LEFT MEDIAL EYEBROW
LOCATION DETAILED: LEFT SUPERIOR LATERAL NECK
LOCATION DETAILED: RIGHT CENTRAL ZYGOMA
LOCATION DETAILED: LEFT PROXIMAL PRETIBIAL REGION
LOCATION DETAILED: RIGHT INFERIOR UPPER BACK
LOCATION DETAILED: LEFT INFERIOR LATERAL MALAR CHEEK
LOCATION DETAILED: LEFT INFERIOR CENTRAL MALAR CHEEK
LOCATION DETAILED: LEFT INFERIOR VERMILION LIP
LOCATION DETAILED: LEFT SUPERIOR UPPER BACK
LOCATION DETAILED: RIGHT INFERIOR LATERAL NECK
LOCATION DETAILED: RIGHT DISTAL POSTERIOR THIGH
LOCATION DETAILED: RIGHT LATERAL SUPERIOR CHEST
LOCATION DETAILED: LEFT INFERIOR MEDIAL LOWER BACK
LOCATION DETAILED: RIGHT ANTERIOR DISTAL THIGH
LOCATION DETAILED: LEFT POPLITEAL SKIN
LOCATION DETAILED: RIGHT RIB CAGE
LOCATION DETAILED: PERIUMBILICAL SKIN

## 2025-06-03 NOTE — PROCEDURE: LIQUID NITROGEN
Post-Care Instructions: I reviewed with the patient in detail post-care instructions. Patient is to wear sunprotection, and avoid picking at any of the treated lesions. Pt may apply Vaseline to crusted or scabbing areas.
Show Applicator Variable?: Yes
Include Z78.9 (Other Specified Conditions Influencing Health Status) As An Associated Diagnosis?: No
Consent: The patient's consent was obtained including but not limited to risks of crusting, scabbing, blistering, scarring, darker or lighter pigmentary change, recurrence, incomplete removal and infection.
Medical Necessity Clause: This procedure was medically necessary because the lesions that were treated were:
Detail Level: Detailed
Spray Paint Text: The liquid nitrogen was applied to the skin utilizing a spray paint frosting technique.
Medical Necessity Information: It is in your best interest to select a reason for this procedure from the list below. All of these items fulfill various CMS LCD requirements except the new and changing color options.